# Patient Record
(demographics unavailable — no encounter records)

---

## 2025-04-14 NOTE — HISTORY OF PRESENT ILLNESS
[de-identified] : This 51 year old man has been aware of an UH for the past several months. Recently, the hernia has become uncomfortable. He has been eating well and denies any recent change in bowel habits

## 2025-04-14 NOTE — PHYSICAL EXAM
[Normal Breath Sounds] : Normal breath sounds [Normal Heart Sounds] : normal heart sounds [Normal Rate and Rhythm] : normal rate and rhythm [Abdominal Masses] : No abdominal masses [Abdomen Tenderness] : ~T ~M No abdominal tenderness [No Rash or Lesion] : No rash or lesion [de-identified] : nl [de-identified] : nl [de-identified] : reducible UH - 3.3 cm. [de-identified] : nl

## 2025-04-14 NOTE — REVIEW OF SYSTEMS
[Heart Rate Is Slow] : the heart rate was not slow [Chest Pain] : no chest pain [Abdominal Pain] : abdominal pain [Negative] : Genitourinary

## 2025-04-14 NOTE — ASSESSMENT
Detail Level: Detailed [FreeTextEntry1] : All options and risks discussed To undergo repair of UH in eight months (cannot stop working ) All lab values and imaging studies reviewed Discussed with Medicine